# Patient Record
Sex: FEMALE | Race: WHITE | NOT HISPANIC OR LATINO
[De-identification: names, ages, dates, MRNs, and addresses within clinical notes are randomized per-mention and may not be internally consistent; named-entity substitution may affect disease eponyms.]

---

## 2022-10-20 ENCOUNTER — APPOINTMENT (OUTPATIENT)
Dept: RHEUMATOLOGY | Facility: CLINIC | Age: 27
End: 2022-10-20

## 2022-12-16 ENCOUNTER — APPOINTMENT (OUTPATIENT)
Dept: RHEUMATOLOGY | Facility: CLINIC | Age: 27
End: 2022-12-16

## 2024-02-14 ENCOUNTER — APPOINTMENT (OUTPATIENT)
Dept: OBGYN | Facility: CLINIC | Age: 29
End: 2024-02-14
Payer: COMMERCIAL

## 2024-02-14 ENCOUNTER — NON-APPOINTMENT (OUTPATIENT)
Age: 29
End: 2024-02-14

## 2024-02-14 VITALS
DIASTOLIC BLOOD PRESSURE: 84 MMHG | HEIGHT: 65 IN | SYSTOLIC BLOOD PRESSURE: 128 MMHG | BODY MASS INDEX: 28.32 KG/M2 | WEIGHT: 170 LBS

## 2024-02-14 DIAGNOSIS — Z76.89 PERSONS ENCOUNTERING HEALTH SERVICES IN OTHER SPECIFIED CIRCUMSTANCES: ICD-10-CM

## 2024-02-14 PROCEDURE — 99203 OFFICE O/P NEW LOW 30 MIN: CPT

## 2024-02-14 NOTE — HISTORY OF PRESENT ILLNESS
[FreeTextEntry1] : 28-year-old female presents for consultation.  She is a new patient to our practice.  Her last annual exam was at the end of 2023.  She was formally seeing [facility].  She would like to discuss her menses today.  Menarche was at the age of 14.  She has a history of irregular menses and was on birth control for many years.  She stopped the birth control secondary to the fact that she did not feel like it was helping with her symptoms.  She states her mother was also nervous about an increased risk of breast cancer while taking the birth control pill.  The patient states that since August her menses have been very irregular.  She states between August and September she was bleeding on a week and off a week for the full 2 months.  Since October her cycles have been every 17 to 31 days.  She states her cycles typically last 5 to 7 days.  She does get heavy bleeding but does not soak through more than 1 pad per hour.  She denies cramping.  She denies acne or hirsutism.  She has no significant GYN history.  She is sexually active and uses condoms for contraception.  She is interested in having a workup for her symptoms.  She has no significant past medical history.  Past surgical history includes a cyst removal from her scalp.  Family history is significant for her paternal grandmother with uterine cancer.  She occasionally drinks alcohol.  Denies tobacco or illicit drugs.  GYN history as above.  Nulligravida.  No known drug allergies.  She does not take any medications.

## 2024-02-14 NOTE — PLAN
[FreeTextEntry1] : 28-year-old female for consultation regarding irregular menstrual cycles.  Patient had abnormal bleeding 1 week on and 1 week off throughout the entire months of August and September.  Since then cycles have been every 17 to 31 days.  She does admit to heavy bleeding but does not soak through more than 1 pad per hour.  Differential diagnosis was reviewed with the patient.  Rx was given for day 3 blood work.  She will return to the office for a transvaginal sonogram and to review the results of her blood work.  We discussed the treatment options will be determined at her next visit pending the results of her blood work and sonogram.  GC cultures were done today to rule out infection.  The patient was given the opportunity to ask questions and all were answered to her satisfaction.

## 2024-02-16 LAB
C TRACH RRNA SPEC QL NAA+PROBE: NOT DETECTED
N GONORRHOEA RRNA SPEC QL NAA+PROBE: NOT DETECTED
SOURCE AMPLIFICATION: NORMAL

## 2024-03-01 LAB
17OHP SERPL-MCNC: 79 NG/DL
CHOLEST SERPL-MCNC: 197 MG/DL
DHEA-S SERPL-MCNC: 176 UG/DL
ESTIMATED AVERAGE GLUCOSE: 111 MG/DL
ESTRADIOL SERPL-MCNC: 37 PG/ML
FSH SERPL-MCNC: 6.6 IU/L
HBA1C MFR BLD HPLC: 5.5 %
HCG SERPL-MCNC: <1 MIU/ML
HCT VFR BLD CALC: 43 %
HDLC SERPL-MCNC: 73 MG/DL
HGB BLD-MCNC: 13.5 G/DL
INSULIN P FAST SERPL-ACNC: 9.2 UU/ML
LDLC SERPL CALC-MCNC: 110 MG/DL
LH SERPL-ACNC: 18.7 IU/L
MCHC RBC-ENTMCNC: 27.4 PG
MCHC RBC-ENTMCNC: 31.4 GM/DL
MCV RBC AUTO: 87.4 FL
NONHDLC SERPL-MCNC: 124 MG/DL
PLATELET # BLD AUTO: 318 K/UL
PROGEST SERPL-MCNC: 0.8 NG/ML
PROLACTIN SERPL-MCNC: 22.6 NG/ML
RBC # BLD: 4.92 M/UL
RBC # FLD: 13.4 %
SHBG SERPL-SCNC: 20 NMOL/L
TESTOST FREE SERPL-MCNC: 1.1 PG/ML
TESTOST SERPL-MCNC: 21.8 NG/DL
TRIGL SERPL-MCNC: 78 MG/DL
TSH SERPL-ACNC: 3.15 UIU/ML
WBC # FLD AUTO: 9.59 K/UL

## 2024-03-17 ENCOUNTER — RX ONLY (RX ONLY)
Age: 29
End: 2024-03-17

## 2024-03-17 ENCOUNTER — OFFICE (OUTPATIENT)
Dept: URBAN - METROPOLITAN AREA CLINIC 38 | Facility: CLINIC | Age: 29
Setting detail: OPHTHALMOLOGY
End: 2024-03-17
Payer: COMMERCIAL

## 2024-03-17 DIAGNOSIS — G43.109: ICD-10-CM

## 2024-03-17 DIAGNOSIS — H01.002: ICD-10-CM

## 2024-03-17 DIAGNOSIS — H01.005: ICD-10-CM

## 2024-03-17 PROCEDURE — 92004 COMPRE OPH EXAM NEW PT 1/>: CPT | Performed by: OPHTHALMOLOGY

## 2024-03-17 ASSESSMENT — REFRACTION_MANIFEST
OS_SPHERE: PLANO
OU_VA: 20/20
OS_CYLINDER: SPHERE
OD_SPHERE: PLANO
OD_CYLINDER: SPHERE
OD_VA1: 20/20
OS_VA1: 20/20

## 2024-03-17 ASSESSMENT — LID EXAM ASSESSMENTS
OS_MEIBOMITIS: LLL LUL T
OD_BLEPHARITIS: RLL T
OD_MEIBOMITIS: RLL RUL T
OS_BLEPHARITIS: LLL T

## 2024-03-25 ENCOUNTER — OFFICE (OUTPATIENT)
Dept: URBAN - METROPOLITAN AREA CLINIC 12 | Facility: CLINIC | Age: 29
Setting detail: OPHTHALMOLOGY
End: 2024-03-25
Payer: COMMERCIAL

## 2024-03-25 DIAGNOSIS — H01.002: ICD-10-CM

## 2024-03-25 DIAGNOSIS — H01.005: ICD-10-CM

## 2024-03-25 PROCEDURE — 99213 OFFICE O/P EST LOW 20 MIN: CPT | Performed by: OPTOMETRIST

## 2024-03-25 ASSESSMENT — LID EXAM ASSESSMENTS
OD_BLEPHARITIS: RLL T
OS_BLEPHARITIS: LLL T
OS_MEIBOMITIS: LLL LUL T
OD_MEIBOMITIS: RLL RUL T

## 2024-03-25 ASSESSMENT — REFRACTION_MANIFEST
OU_VA: 20/20
OS_SPHERE: PLANO
OD_VA1: 20/20
OD_SPHERE: PLANO
OS_VA1: 20/20
OS_CYLINDER: SPHERE
OD_CYLINDER: SPHERE

## 2024-03-29 ENCOUNTER — ASOB RESULT (OUTPATIENT)
Age: 29
End: 2024-03-29

## 2024-03-29 ENCOUNTER — APPOINTMENT (OUTPATIENT)
Dept: OBGYN | Facility: CLINIC | Age: 29
End: 2024-03-29
Payer: COMMERCIAL

## 2024-03-29 ENCOUNTER — APPOINTMENT (OUTPATIENT)
Dept: ANTEPARTUM | Facility: CLINIC | Age: 29
End: 2024-03-29
Payer: COMMERCIAL

## 2024-03-29 VITALS — HEIGHT: 65 IN | BODY MASS INDEX: 28.32 KG/M2 | WEIGHT: 170 LBS

## 2024-03-29 DIAGNOSIS — N92.6 IRREGULAR MENSTRUATION, UNSPECIFIED: ICD-10-CM

## 2024-03-29 DIAGNOSIS — E28.2 POLYCYSTIC OVARIAN SYNDROME: ICD-10-CM

## 2024-03-29 PROCEDURE — 99214 OFFICE O/P EST MOD 30 MIN: CPT

## 2024-03-29 PROCEDURE — 76830 TRANSVAGINAL US NON-OB: CPT

## 2024-03-29 PROCEDURE — 76856 US EXAM PELVIC COMPLETE: CPT | Mod: 59

## 2024-04-03 RX ORDER — NORETHINDRONE ACETATE AND ETHINYL ESTRADIOL, ETHINYL ESTRADIOL AND FERROUS FUMARATE 1MG-10(24)
1 MG-10 MCG / KIT ORAL DAILY
Qty: 3 | Refills: 0 | Status: ACTIVE | COMMUNITY
Start: 2024-04-03 | End: 1900-01-01

## 2024-04-15 ENCOUNTER — OFFICE (OUTPATIENT)
Dept: URBAN - METROPOLITAN AREA CLINIC 12 | Facility: CLINIC | Age: 29
Setting detail: OPHTHALMOLOGY
End: 2024-04-15
Payer: COMMERCIAL

## 2024-04-15 DIAGNOSIS — H01.002: ICD-10-CM

## 2024-04-15 DIAGNOSIS — H01.005: ICD-10-CM

## 2024-04-15 PROCEDURE — 99212 OFFICE O/P EST SF 10 MIN: CPT | Performed by: OPTOMETRIST

## 2024-04-15 ASSESSMENT — LID EXAM ASSESSMENTS
OS_BLEPHARITIS: LLL T
OS_MEIBOMITIS: LLL LUL T
OD_MEIBOMITIS: RLL RUL T
OD_BLEPHARITIS: RLL T

## 2024-06-19 PROBLEM — N92.6 IRREGULAR MENSTRUAL BLEEDING: Status: ACTIVE | Noted: 2024-02-14

## 2024-06-19 PROBLEM — E28.2 PCOS (POLYCYSTIC OVARIAN SYNDROME): Status: ACTIVE | Noted: 2024-06-19

## 2024-06-19 NOTE — PLAN
[FreeTextEntry1] : 28-year-old female with history of irregular menses.  Blood work and sonogram were reviewed with the patient today.  Blood work was significant for an LH to FSH ratio of 3:1.  Also low sex hormone binding globulin.  Patient is aware that the lab work is suggestive but not diagnostic of PCOS.  She is aware that PCOS is a clinical diagnosis.  Sonogram was done by the tech today.  There was a simple right ovarian cyst measuring 2.5 cm in size.  The patient is aware of the high likelihood of spontaneous resolution of small simple ovarian cyst.  She is currently asymptomatic.  Results of blood work and sonogram with clinical picture suggestive of a mild case of PCOS.  The patient was counseled extensively on PCOS.  We discussed the risk of endometrial hyperplasia/cancer in the future if she does not get her menses at least every 3 months.  Patient currently getting her menses every 17 to 31 days.  She is interested in starting hormones to help regulate her cycle.  We discussed all hormonal options including the pill, the patch, the ring, progesterone only options including the minipill, Depo-Provera, Nexplanon and progesterone secreting IUDs.  After discussion, she would like to start loloestrin.  All risk, benefits and potential complications of combined OCPs were reviewed with the patient.  Rx was provided for 3 months.  She will return to the office in 3 months for a blood pressure and OCP check.  We also discussed increased risk of hypertension, diabetes, and obesity with PCOS.  We also discussed increased risk of problems with fertility.  We discussed that inositol supplementation can be helpful in patients with PCOS.  She is interested in trying inositol.  The patient was given the opportunity to ask questions and all were answered to her satisfaction.    She will return to the office in 3 months for blood pressure and OCP check.

## 2024-06-19 NOTE — HISTORY OF PRESENT ILLNESS
[FreeTextEntry1] : 28-year-old female presents for blood work and sono results.  Menarche was at the age of 14.  She has a history of irregular menses and was on birth control for many years.  She stopped the birth control secondary to the fact that she did not feel like it was helping with her symptoms.  She states her mother was also nervous about an increased risk of breast cancer while taking the birth control pill.  The patient states that since August her menses have been very irregular.  She states between August and September she was bleeding on a week and off a week for the full 2 months.  Since October her cycles have been every 17 to 31 days.  She states her cycles typically last 5 to 7 days.  She does get heavy bleeding but does not soak through more than 1 pad per hour.  She denies cramping.  She denies acne or hirsutism.  She has no symptoms of anemia she has no significant GYN history.  She is sexually active and uses condoms for contraception.  She is considering going back on hormones to help regulate her cycles.  She has no significant past medical history.  Past surgical history includes a cyst removal from her scalp.  Family history is significant for her paternal grandmother with uterine cancer.  She occasionally drinks alcohol.  Denies tobacco or illicit drugs.  GYN history as above.  Nulligravida.  No known drug allergies.  She does not take any medications.

## 2024-07-02 ENCOUNTER — APPOINTMENT (OUTPATIENT)
Dept: OBGYN | Facility: CLINIC | Age: 29
End: 2024-07-02

## 2025-08-05 ENCOUNTER — APPOINTMENT (OUTPATIENT)
Dept: OBGYN | Facility: CLINIC | Age: 30
End: 2025-08-05
Payer: COMMERCIAL

## 2025-08-05 VITALS
WEIGHT: 177 LBS | HEIGHT: 65 IN | BODY MASS INDEX: 29.49 KG/M2 | DIASTOLIC BLOOD PRESSURE: 83 MMHG | SYSTOLIC BLOOD PRESSURE: 130 MMHG

## 2025-08-05 DIAGNOSIS — Z01.419 ENCOUNTER FOR GYNECOLOGICAL EXAMINATION (GENERAL) (ROUTINE) W/OUT ABNORMAL FINDINGS: ICD-10-CM

## 2025-08-05 DIAGNOSIS — E28.2 POLYCYSTIC OVARIAN SYNDROME: ICD-10-CM

## 2025-08-05 PROCEDURE — 99395 PREV VISIT EST AGE 18-39: CPT

## 2025-08-07 LAB
C TRACH RRNA SPEC QL NAA+PROBE: NOT DETECTED
N GONORRHOEA RRNA SPEC QL NAA+PROBE: NOT DETECTED
SOURCE TP AMPLIFICATION: NORMAL

## 2025-08-11 LAB — CYTOLOGY CVX/VAG DOC THIN PREP: NORMAL
